# Patient Record
(demographics unavailable — no encounter records)

---

## 2025-04-01 NOTE — DISCUSSION/SUMMARY
[FreeTextEntry1] : Patient is pleased about pregnancy.     We discussed food safety and avoidance of environmental toxins and safe OTC medications. To start PNV  Patient was oriented the practice and [facility]  Rx Diclegis for nausea Discussed Panorama and Horizon testing and patient provided with brochure to read before first visit.  All questions answered and patient to RTO for first prenatal visit at 8-10 weeks.    Patient verbalizes understanding of and agreement with this plan.  All questions answered to patient's satisfaction.

## 2025-04-01 NOTE — HISTORY OF PRESENT ILLNESS
[FreeTextEntry1] : 26 yo   for confirmation of pregnancy today. Positive in-house urine Hcg today LMP: End of January, cannot recall date CORNELL based on LMP: approx. 25 EGA based on LMP: approx 8 weeks  Feels well but nauseated Pregnancy was planned Has not yet started PNV [Patient reported PAP Smear was normal] : Patient reported PAP Smear was normal [PapSmeardate] : 2023